# Patient Record
Sex: MALE | Race: WHITE | NOT HISPANIC OR LATINO | Employment: STUDENT | URBAN - METROPOLITAN AREA
[De-identification: names, ages, dates, MRNs, and addresses within clinical notes are randomized per-mention and may not be internally consistent; named-entity substitution may affect disease eponyms.]

---

## 2024-05-03 ENCOUNTER — OFFICE VISIT (OUTPATIENT)
Dept: UROLOGY | Facility: MEDICAL CENTER | Age: 24
End: 2024-05-03
Payer: COMMERCIAL

## 2024-05-03 VITALS
WEIGHT: 188.4 LBS | SYSTOLIC BLOOD PRESSURE: 118 MMHG | DIASTOLIC BLOOD PRESSURE: 78 MMHG | HEART RATE: 84 BPM | OXYGEN SATURATION: 98 % | BODY MASS INDEX: 27.91 KG/M2 | HEIGHT: 69 IN

## 2024-05-03 DIAGNOSIS — R35.0 FREQUENT URINATION: Primary | ICD-10-CM

## 2024-05-03 LAB
POST-VOID RESIDUAL VOLUME, ML POC: 49 ML
SL AMB  POCT GLUCOSE, UA: NORMAL
SL AMB LEUKOCYTE ESTERASE,UA: NORMAL
SL AMB POCT BILIRUBIN,UA: NORMAL
SL AMB POCT BLOOD,UA: NORMAL
SL AMB POCT CLARITY,UA: CLEAR
SL AMB POCT COLOR,UA: YELLOW
SL AMB POCT KETONES,UA: NORMAL
SL AMB POCT NITRITE,UA: NORMAL
SL AMB POCT PH,UA: 7
SL AMB POCT SPECIFIC GRAVITY,UA: 1.02
SL AMB POCT URINE PROTEIN: NORMAL
SL AMB POCT UROBILINOGEN: 1

## 2024-05-03 PROCEDURE — 81003 URINALYSIS AUTO W/O SCOPE: CPT | Performed by: UROLOGY

## 2024-05-03 PROCEDURE — 99203 OFFICE O/P NEW LOW 30 MIN: CPT | Performed by: UROLOGY

## 2024-05-03 PROCEDURE — 51798 US URINE CAPACITY MEASURE: CPT | Performed by: UROLOGY

## 2024-05-03 RX ORDER — VITAMIN B COMPLEX
1 CAPSULE ORAL DAILY
COMMUNITY

## 2024-05-03 RX ORDER — MOMETASONE FUROATE 50 UG/1
SPRAY, METERED NASAL
COMMUNITY

## 2024-05-03 RX ORDER — MAGNESIUM L-LACTATE 84 MG
84 TABLET, EXTENDED RELEASE ORAL DAILY
COMMUNITY

## 2024-05-03 RX ORDER — MULTIVIT WITH MINERALS/LUTEIN
1000 TABLET ORAL DAILY
COMMUNITY

## 2024-05-03 RX ORDER — IBUPROFEN 200 MG
200 TABLET ORAL EVERY 6 HOURS PRN
COMMUNITY

## 2024-05-03 NOTE — PROGRESS NOTES
"   HISTORY:    Patient complains of over 1 year of daytime anger increased urinary frequency.    When this problem first started, he had nocturia x 1-3, but that has stopped.  He does not wake up to urinate.    Daytime he will have the urge to go 2-3 times after drinking a bottle of water over 30 to 60 minutes.  He will then urinate every 30 to 90 minutes all day long.    No burning urgency frequency or any type of stream issues.    No prior  history    He had CT scan done at [place] within the past month for  bilateral flank pain.  Kidneys and bladder normal on that exam    PVR 47 mL today         ASSESSMENT / PLAN:    1.  Unclear etiology of daytime frequency.  The fact that he has no nocturia would indicate is not a primary bladder problem.    2.  In the absence of any retention, irritative voiding symptoms, or incontinence, I recommend observation of this problem.  In most cases this problem resolves itself over time.  There is often no etiology determined.    I told him in some cases of tremendous frequency that goes on for a long time, especially with nocturia, we will consider overactive bladder medications.    I do not recommend meds for now, I would like to see him back in 6 months or so.    The following portions of the patient's history were reviewed and updated as appropriate: allergies, current medications, past family history, past medical history, past social history, past surgical history, and problem list.    Review of Systems      Objective:     Physical Exam  Genitourinary:     Comments: Penis testes normal          No results found for: \"PSA\"]  No results found for: \"BUN\"  No results found for: \"CREATININE\"  No components found for: \"CBC\"      There is no problem list on file for this patient.       Diagnoses and all orders for this visit:    Frequent urination  -     POCT urine dip auto non-scope  -     POCT Measure PVR    Other orders  -     Aspirin 325 MG CAPS; Take 325 mg by mouth daily  -  "    mometasone (Nasonex 24HR) 50 mcg/act nasal spray; into each nostril (Patient not taking: Reported on 5/3/2024)  -     ibuprofen (MOTRIN) 200 mg tablet; Take 200 mg by mouth every 6 (six) hours as needed  -     b complex vitamins capsule; Take 1 capsule by mouth daily  -     Ascorbic Acid (vitamin C) 1000 MG tablet; Take 1,000 mg by mouth daily  -     magnesium (MAGTAB) 84 MG (7MEQ) TBCR; Take 84 mg by mouth daily  -     other medication, see sig,; Take by mouth daily Medication/product name: Lions Azael Mushroom    Strength: N/A    Sig (include dose, route, frequency): Oral           Patient ID: Micah Frank is a 23 y.o. male.      Current Outpatient Medications:     Ascorbic Acid (vitamin C) 1000 MG tablet, Take 1,000 mg by mouth daily, Disp: , Rfl:     Aspirin 325 MG CAPS, Take 325 mg by mouth daily, Disp: , Rfl:     b complex vitamins capsule, Take 1 capsule by mouth daily, Disp: , Rfl:     ibuprofen (MOTRIN) 200 mg tablet, Take 200 mg by mouth every 6 (six) hours as needed, Disp: , Rfl:     magnesium (MAGTAB) 84 MG (7MEQ) TBCR, Take 84 mg by mouth daily, Disp: , Rfl:     other medication, see sig,, Take by mouth daily Medication/product name: Lions Azael Mushroom   Strength: N/A   Sig (include dose, route, frequency): Oral, Disp: , Rfl:     mometasone (Nasonex 24HR) 50 mcg/act nasal spray, into each nostril (Patient not taking: Reported on 5/3/2024), Disp: , Rfl:     History reviewed. No pertinent past medical history.    History reviewed. No pertinent surgical history.    Social History

## 2024-08-06 ENCOUNTER — OFFICE VISIT (OUTPATIENT)
Age: 24
End: 2024-08-06
Payer: COMMERCIAL

## 2024-08-06 VITALS
TEMPERATURE: 97.9 F | DIASTOLIC BLOOD PRESSURE: 80 MMHG | HEIGHT: 69 IN | SYSTOLIC BLOOD PRESSURE: 134 MMHG | RESPIRATION RATE: 16 BRPM | OXYGEN SATURATION: 97 % | WEIGHT: 189.6 LBS | HEART RATE: 85 BPM | BODY MASS INDEX: 28.08 KG/M2

## 2024-08-06 DIAGNOSIS — S91.011A LACERATION OF RIGHT ANKLE, INITIAL ENCOUNTER: Primary | ICD-10-CM

## 2024-08-06 DIAGNOSIS — Z23 ENCOUNTER FOR IMMUNIZATION: ICD-10-CM

## 2024-08-06 PROCEDURE — S9083 URGENT CARE CENTER GLOBAL: HCPCS | Performed by: PREVENTIVE MEDICINE

## 2024-08-06 PROCEDURE — 90715 TDAP VACCINE 7 YRS/> IM: CPT

## 2024-08-06 PROCEDURE — G0382 LEV 3 HOSP TYPE B ED VISIT: HCPCS | Performed by: PREVENTIVE MEDICINE

## 2024-08-06 PROCEDURE — 12004 RPR S/N/AX/GEN/TRK7.6-12.5CM: CPT | Performed by: PREVENTIVE MEDICINE

## 2024-08-06 PROCEDURE — 90471 IMMUNIZATION ADMIN: CPT | Performed by: PREVENTIVE MEDICINE

## 2024-08-06 PROCEDURE — 12001 RPR S/N/AX/GEN/TRNK 2.5CM/<: CPT | Performed by: PREVENTIVE MEDICINE

## 2024-08-06 NOTE — PATIENT INSTRUCTIONS
Keep the area clean and dry.  Change the bandage every day.  Watch for infection.  Steri-Strips should come off or fall off in 5 to 7 days.

## 2024-08-06 NOTE — PROGRESS NOTES
[facility]        NAME: Micah Frank is a 23 y.o. male  : 2000    MRN: [id number]  DATE: 2024  TIME: 7:01 PM    Assessment and Plan   Laceration of right ankle, initial encounter [S91.011A]  1. Laceration of right ankle, initial encounter        2. Encounter for immunization  Tdap Vaccine greater than or equal to 8yo            Patient Instructions       Follow up with PCP in 3-5 days.  Proceed to  ER if symptoms worsen.    If tests have been performed at [facility], our office will contact you with results if changes need to be made to the care plan discussed with you at the visit.  You can review your full results on [facility].    Chief Complaint     Chief Complaint   Patient presents with    Extremity Laceration     Right lateral lower leg laceration after hitting leg on a bed frame about 20 minutes ago. Due for tetanus vaccine.         History of Present Illness       Laceration right lateral ankle on a metal bed frame        Review of Systems   Review of Systems   Skin:  Positive for wound.         Current Medications       Current Outpatient Medications:     Ascorbic Acid (vitamin C) 1000 MG tablet, Take 1,000 mg by mouth daily, Disp: , Rfl:     b complex vitamins capsule, Take 1 capsule by mouth daily, Disp: , Rfl:     magnesium (MAGTAB) 84 MG (7MEQ) TBCR, Take 84 mg by mouth daily, Disp: , Rfl:     other medication, see sig,, Take by mouth daily Medication/product name: Lions Azael Mushroom   Strength: N/A   Sig (include dose, route, frequency): Oral, Disp: , Rfl:     Aspirin 325 MG CAPS, Take 325 mg by mouth daily (Patient not taking: Reported on 2024), Disp: , Rfl:     ibuprofen (MOTRIN) 200 mg tablet, Take 200 mg by mouth every 6 (six) hours as needed (Patient not taking: Reported on 2024), Disp: , Rfl:     mometasone (Nasonex 24HR) 50 mcg/act nasal spray, into each nostril (Patient not taking: Reported on 5/3/2024), Disp: , Rfl:     Current Allergies  "    Allergies as of 08/06/2024 - Reviewed 08/06/2024   Allergen Reaction Noted    Peanut (diagnostic) - food allergy Anaphylaxis, Hives, Itching, Rash, and Shortness Of Breath 05/03/2024    Cat hair extract Itching 11/13/2012            The following portions of the patient's history were reviewed and updated as appropriate: allergies, current medications, past family history, past medical history, past social history, past surgical history and problem list.     Past Medical History:   Diagnosis Date    DDD (degenerative disc disease), cervical        Past Surgical History:   Procedure Laterality Date    TYMPANOSTOMY TUBE PLACEMENT Bilateral        Family History   Problem Relation Age of Onset    No Known Problems Mother     No Known Problems Father     Diabetes type I Sister 9         Medications have been verified.        Objective   /80   Pulse 85   Temp 97.9 °F (36.6 °C)   Resp 16   Ht 5' 9\" (1.753 m)   Wt 86 kg (189 lb 9.5 oz)   SpO2 97%   BMI 28.00 kg/m²   No LMP for male patient.       Physical Exam     Physical Exam  Skin:     Comments: 3 inch long superficial laceration right lateral ankle not deep enough to suture.  No bleeding.     Laceration repair    Date/Time: 8/6/2024 6:30 PM    Performed by: Tyler Chen MD  Authorized by: Tyler Chen MD  Body area: lower extremity  Location details: right ankle  Laceration length: 8 cm  Tendon involvement: none  Nerve involvement: none  Vascular damage: no    Anesthesia:  Anesthetic total: 0 mL    Wound Dehiscence:  Superficial Wound Dehiscence: simple closure      Procedure Details:  Irrigation solution: saline  Amount of cleaning: standard  Debridement: none  Degree of undermining: none  Approximation: close  Approximation difficulty: simple  Dressing: 4x4 sterile gauze  Patient tolerance: patient tolerated the procedure well with no immediate complications      3 Steri-Strips in place                "